# Patient Record
Sex: MALE | Race: OTHER | Employment: OTHER | URBAN - METROPOLITAN AREA
[De-identification: names, ages, dates, MRNs, and addresses within clinical notes are randomized per-mention and may not be internally consistent; named-entity substitution may affect disease eponyms.]

---

## 2017-10-09 ENCOUNTER — HOSPITAL ENCOUNTER (OUTPATIENT)
Age: 62
Discharge: HOME | End: 2017-10-09
Attending: FAMILY MEDICINE

## 2017-10-09 ENCOUNTER — OFFICE VISIT (OUTPATIENT)
Dept: FAMILY MEDICINE CLINIC | Facility: CLINIC | Age: 62
End: 2017-10-09

## 2017-10-09 VITALS
WEIGHT: 180 LBS | DIASTOLIC BLOOD PRESSURE: 84 MMHG | TEMPERATURE: 99 F | OXYGEN SATURATION: 96 % | HEART RATE: 73 BPM | HEIGHT: 69 IN | RESPIRATION RATE: 16 BRPM | BODY MASS INDEX: 26.66 KG/M2 | SYSTOLIC BLOOD PRESSURE: 152 MMHG

## 2017-10-09 DIAGNOSIS — S69.91XA INJURY OF FINGER OF RIGHT HAND, INITIAL ENCOUNTER: Primary | ICD-10-CM

## 2017-10-09 DIAGNOSIS — S61.212A LACERATION OF RIGHT MIDDLE FINGER WITHOUT FOREIGN BODY WITHOUT DAMAGE TO NAIL, INITIAL ENCOUNTER: Primary | ICD-10-CM

## 2017-10-09 PROCEDURE — 12001 RPR S/N/AX/GEN/TRNK 2.5CM/<: CPT

## 2017-10-09 PROCEDURE — 99203 OFFICE O/P NEW LOW 30 MIN: CPT

## 2017-10-09 RX ORDER — IBUPROFEN 600 MG/1
600 TABLET ORAL EVERY 8 HOURS PRN
Qty: 30 TABLET | Refills: 0 | Status: SHIPPED | OUTPATIENT
Start: 2017-10-09 | End: 2017-10-16

## 2017-10-09 NOTE — ED INITIAL ASSESSMENT (HPI)
Patient was washing dishes 30 minutes ago and cut the tip of his right 3rd finger with a knife. Tetanus > 10 years ago.

## 2017-10-09 NOTE — PROGRESS NOTES
Jayce Mckeon is a 60year old male who presents to [facility] with c/o laceration to right 3rd finger. Cut finger with knife in kitchen. Pt applied turmeric to stop bleeding. Wrapped finger and came here. Wound was washed to visualize laceration.

## 2017-10-09 NOTE — ED PROVIDER NOTES
Patient Seen in: [address] [facility]    History   Patient presents with:  Laceration Abrasion (integumentary)    Stated Complaint: finger laceration    HPI    Patient was washing his dishes in his home 30 minutes prior to arrival. REPAIR  Anesthesia Type: 0.5 cc 1% lidocaine  Location: Distal aspect of right middle finger  Size: 2 cm  Shape: Flap  FB present: NA  Cleansing: NS under pressure  Wound Closure: 5 x  4-0 Prolene  N/V intact: Yes  TendonFunctionIntact: Yes  DressingType: